# Patient Record
Sex: MALE | Race: WHITE | NOT HISPANIC OR LATINO
[De-identification: names, ages, dates, MRNs, and addresses within clinical notes are randomized per-mention and may not be internally consistent; named-entity substitution may affect disease eponyms.]

---

## 2018-07-30 ENCOUNTER — APPOINTMENT (OUTPATIENT)
Dept: COLORECTAL SURGERY | Facility: CLINIC | Age: 63
End: 2018-07-30
Payer: COMMERCIAL

## 2018-07-30 VITALS
HEART RATE: 72 BPM | SYSTOLIC BLOOD PRESSURE: 153 MMHG | BODY MASS INDEX: 37.94 KG/M2 | WEIGHT: 271 LBS | DIASTOLIC BLOOD PRESSURE: 89 MMHG | HEIGHT: 71 IN | TEMPERATURE: 98.8 F

## 2018-07-30 DIAGNOSIS — Z80.0 FAMILY HISTORY OF MALIGNANT NEOPLASM OF DIGESTIVE ORGANS: ICD-10-CM

## 2018-07-30 DIAGNOSIS — K62.1 RECTAL POLYP: ICD-10-CM

## 2018-07-30 DIAGNOSIS — E78.00 PURE HYPERCHOLESTEROLEMIA, UNSPECIFIED: ICD-10-CM

## 2018-07-30 DIAGNOSIS — Z78.9 OTHER SPECIFIED HEALTH STATUS: ICD-10-CM

## 2018-07-30 PROBLEM — Z00.00 ENCOUNTER FOR PREVENTIVE HEALTH EXAMINATION: Status: ACTIVE | Noted: 2018-07-30

## 2018-07-30 PROCEDURE — 46600 DIAGNOSTIC ANOSCOPY SPX: CPT

## 2018-07-30 PROCEDURE — 99203 OFFICE O/P NEW LOW 30 MIN: CPT | Mod: 25

## 2018-07-30 RX ORDER — FENOFIBRATE 145 MG/1
145 TABLET ORAL
Refills: 0 | Status: ACTIVE | COMMUNITY

## 2018-07-30 RX ORDER — SIMVASTATIN 40 MG/1
TABLET, FILM COATED ORAL
Refills: 0 | Status: ACTIVE | COMMUNITY

## 2018-09-26 NOTE — ASU PATIENT PROFILE, ADULT - PSH
History of hernia repair    History of tonsillectomy History of hernia repair  umbilical  History of tonsillectomy

## 2018-09-27 ENCOUNTER — APPOINTMENT (OUTPATIENT)
Dept: COLORECTAL SURGERY | Facility: HOSPITAL | Age: 63
End: 2018-09-27

## 2018-09-27 ENCOUNTER — RESULT REVIEW (OUTPATIENT)
Age: 63
End: 2018-09-27

## 2018-09-27 ENCOUNTER — OUTPATIENT (OUTPATIENT)
Dept: OUTPATIENT SERVICES | Facility: HOSPITAL | Age: 63
LOS: 1 days | Discharge: HOME | End: 2018-09-27
Payer: COMMERCIAL

## 2018-09-27 VITALS
OXYGEN SATURATION: 95 % | HEART RATE: 69 BPM | DIASTOLIC BLOOD PRESSURE: 81 MMHG | WEIGHT: 262.79 LBS | SYSTOLIC BLOOD PRESSURE: 158 MMHG | HEIGHT: 71.5 IN | TEMPERATURE: 98 F | RESPIRATION RATE: 18 BRPM

## 2018-09-27 VITALS
RESPIRATION RATE: 16 BRPM | HEART RATE: 72 BPM | OXYGEN SATURATION: 97 % | SYSTOLIC BLOOD PRESSURE: 117 MMHG | DIASTOLIC BLOOD PRESSURE: 69 MMHG

## 2018-09-27 DIAGNOSIS — Z98.890 OTHER SPECIFIED POSTPROCEDURAL STATES: Chronic | ICD-10-CM

## 2018-09-27 DIAGNOSIS — Z90.89 ACQUIRED ABSENCE OF OTHER ORGANS: Chronic | ICD-10-CM

## 2018-09-27 PROCEDURE — 45171 EXC RECT TUM TRANSANAL PART: CPT | Mod: GC

## 2018-09-27 PROCEDURE — 88305 TISSUE EXAM BY PATHOLOGIST: CPT

## 2018-09-27 PROCEDURE — 45171 EXC RECT TUM TRANSANAL PART: CPT

## 2018-09-27 RX ORDER — OXYCODONE HYDROCHLORIDE 5 MG/1
10 TABLET ORAL ONCE
Qty: 0 | Refills: 0 | Status: DISCONTINUED | OUTPATIENT
Start: 2018-09-27 | End: 2018-09-27

## 2018-09-27 RX ORDER — SODIUM CHLORIDE 9 MG/ML
1000 INJECTION, SOLUTION INTRAVENOUS
Qty: 0 | Refills: 0 | Status: DISCONTINUED | OUTPATIENT
Start: 2018-09-27 | End: 2018-09-27

## 2018-09-27 RX ORDER — OXYCODONE HYDROCHLORIDE 5 MG/1
5 TABLET ORAL ONCE
Qty: 0 | Refills: 0 | Status: DISCONTINUED | OUTPATIENT
Start: 2018-09-27 | End: 2018-09-27

## 2018-09-27 RX ORDER — ONDANSETRON 8 MG/1
4 TABLET, FILM COATED ORAL EVERY 6 HOURS
Qty: 0 | Refills: 0 | Status: DISCONTINUED | OUTPATIENT
Start: 2018-09-27 | End: 2018-09-27

## 2018-09-27 RX ORDER — DOCUSATE SODIUM 100 MG
1 CAPSULE ORAL
Qty: 30 | Refills: 0 | OUTPATIENT
Start: 2018-09-27

## 2018-09-27 NOTE — BRIEF OPERATIVE NOTE - OPERATION/FINDINGS
[id number] anal polyp, pedunculated in the right anterolateral position roughly1-2 cm distal to the dentate line. Polypectomy with ligassure. 2 figure-of-8 vicryl sutures placed for hemostasis. Hemostasis achieved at the end of the case.

## 2018-10-10 PROBLEM — K62.1 RECTAL POLYP: Chronic | Status: ACTIVE | Noted: 2018-09-26

## 2018-10-10 PROBLEM — G47.30 SLEEP APNEA, UNSPECIFIED: Chronic | Status: ACTIVE | Noted: 2018-09-27

## 2018-10-10 PROBLEM — E78.5 HYPERLIPIDEMIA, UNSPECIFIED: Chronic | Status: ACTIVE | Noted: 2018-09-26

## 2018-10-19 ENCOUNTER — APPOINTMENT (OUTPATIENT)
Dept: COLORECTAL SURGERY | Facility: CLINIC | Age: 63
End: 2018-10-19
Payer: COMMERCIAL

## 2018-10-19 VITALS
HEIGHT: 71 IN | HEART RATE: 89 BPM | BODY MASS INDEX: 36.82 KG/M2 | DIASTOLIC BLOOD PRESSURE: 89 MMHG | WEIGHT: 263 LBS | SYSTOLIC BLOOD PRESSURE: 144 MMHG | TEMPERATURE: 98.4 F

## 2018-10-19 PROCEDURE — 46600 DIAGNOSTIC ANOSCOPY SPX: CPT | Mod: 79

## 2018-10-19 PROCEDURE — 99024 POSTOP FOLLOW-UP VISIT: CPT

## 2020-01-02 NOTE — ASU PREOP CHECKLIST - SELECT TESTS ORDERED
Urinalysis/CBC/CMP/PT/PTT Post-Care Instructions: I reviewed with the patient in detail post-care instructions. Patient is not to engage in any heavy lifting, exercise, or swimming for the next 14 days. Should the patient develop any fevers, chills, bleeding, severe pain patient will contact the office immediately.

## 2023-06-01 ENCOUNTER — NON-APPOINTMENT (OUTPATIENT)
Age: 68
End: 2023-06-01

## 2023-06-02 ENCOUNTER — APPOINTMENT (OUTPATIENT)
Dept: COLORECTAL SURGERY | Facility: CLINIC | Age: 68
End: 2023-06-02
Payer: MEDICARE

## 2023-06-02 ENCOUNTER — NON-APPOINTMENT (OUTPATIENT)
Age: 68
End: 2023-06-02

## 2023-06-02 VITALS
DIASTOLIC BLOOD PRESSURE: 87 MMHG | SYSTOLIC BLOOD PRESSURE: 138 MMHG | WEIGHT: 270 LBS | BODY MASS INDEX: 37.8 KG/M2 | HEART RATE: 79 BPM | TEMPERATURE: 97.4 F | HEIGHT: 71 IN

## 2023-06-02 DIAGNOSIS — K64.8 OTHER HEMORRHOIDS: ICD-10-CM

## 2023-06-02 PROCEDURE — 99214 OFFICE O/P EST MOD 30 MIN: CPT | Mod: 25

## 2023-06-02 PROCEDURE — 46600 DIAGNOSTIC ANOSCOPY SPX: CPT

## 2023-06-02 NOTE — ASSESSMENT
[FreeTextEntry1] : I reviewed with the patient that I believe his symptoms are mostly attributable to incomplete evacuation secondary to stool consistency changes.  The symptoms are further exacerbated by his large internal/external hemorrhoids.  Recommend fiber and bulking agents to improve stool consistency.\par \par Advised follow-up in 4 to 6 weeks if symptoms are persistent.  All questions answered

## 2023-06-02 NOTE — HISTORY OF PRESENT ILLNESS
[FreeTextEntry1] : 69 y/o M presents for initial evaluation of anal polyp \par Patient known to Dr. Hoyt\par \par S/p transanal excision of rectal polyp, path c/w fibroepithelial polyp. \par \par Patient was last seen in the office 10/19/2018, No perianal excoriation. External hemorrhoid. Residual hemorrhoidal skin tags were noted. Sphincter tone was normal. There was no rectal mass. Anoscopy findings revealed, mild moderate internal hemorrhoids. \par \par Most recent colonoscopy performed [facility] with Dr. Sebastien Kang on 2/10/2023: Internal hemorrhoids were found. The hemorrhoids were moderate. There is a pedunculated anal skin tag in the anal canal. Multiple sessile polyps were found in the rectum and sigmoid colon. The polyps were diminutive in size.S/p removed. A small polyp was found in the sigmoid colon, s/p removed. Three sessile polyps were found in the descending, transverse and ascending colon. S/p removed. Scattered small and large diverticula found in the left colon and right colon.  The TI could not be intubated with the large cuff. 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 \par \par Pt doing well. Denies anal pain, bleeding or itching\par Admits hemorrhoids or tag seem to make cleaning after BMs difficult and has to wipe repeatedly. May sometimes feel wetness or irritation after hiking.\par \par Moving bowels daily, no complaints\par Denies ASA/NSAID use

## 2023-06-02 NOTE — PHYSICAL EXAM
[Normal rectal exam] : exam was normal [Excoriation] : no perianal excoriation [Skin Tags] : residual hemorrhoidal skin tags were noted [Normal] : was normal [None] : there was no rectal mass  [de-identified] : Moderate/large external hemorrhoids.  Left lateral largest. [FreeTextEntry1] : Medical assistant was present for the entire exam.\par \par Anoscopy was performed for evaluation of the patients rectal bleeding  history .\par The risks, benefits and alternatives were reviewed.\par \par A lighted anoscope was passed into the anal canal and the entire anal mucosal surface was inspected..  \par The findings revealed moderate/large internal hemorrhoids.\par \par Right posterior small hypertrophied papilla with stalk.\par

## 2024-02-02 ENCOUNTER — APPOINTMENT (OUTPATIENT)
Dept: COLORECTAL SURGERY | Facility: CLINIC | Age: 69
End: 2024-02-02
Payer: MEDICARE

## 2024-02-02 VITALS
DIASTOLIC BLOOD PRESSURE: 83 MMHG | TEMPERATURE: 97 F | HEIGHT: 71 IN | BODY MASS INDEX: 38.36 KG/M2 | HEART RATE: 85 BPM | SYSTOLIC BLOOD PRESSURE: 143 MMHG | WEIGHT: 274 LBS

## 2024-02-02 DIAGNOSIS — K62.0 ANAL POLYP: ICD-10-CM

## 2024-02-02 PROCEDURE — 99213 OFFICE O/P EST LOW 20 MIN: CPT | Mod: 25

## 2024-02-02 PROCEDURE — 46600 DIAGNOSTIC ANOSCOPY SPX: CPT

## 2024-02-02 NOTE — PHYSICAL EXAM
[Normal rectal exam] : exam was normal [Excoriation] : no perianal excoriation [Skin Tags] : residual hemorrhoidal skin tags were noted [Normal] : was normal [None] : there was no rectal mass  [de-identified] : Moderate/large external hemorrhoids.  Left lateral largest. [FreeTextEntry1] : Medical assistant was present for the entire exam.\par  \par  Anoscopy was performed for evaluation of the patients rectal bleeding  history .\par  The risks, benefits and alternatives were reviewed.\par  \par  A lighted anoscope was passed into the anal canal and the entire anal mucosal surface was inspected..  \par  The findings revealed moderate/large internal hemorrhoids.\par  \par  Right posterior small hypertrophied papilla with stalk.\par

## 2024-02-02 NOTE — ASSESSMENT
[FreeTextEntry1] : Advised conservative management for benign anal papilla.  Recommend fiber/bulking agents to improve stool consistency and alleviate incomplete defecation.

## 2024-02-02 NOTE — HISTORY OF PRESENT ILLNESS
[FreeTextEntry1] : 69 y/o M presents for f/u evaluation of anal polyp  h/o transanal excision of rectal polyp years ago, path c/w fibroepithelial polyp.  Seen in the office 10/19/2018, No perianal excoriation. External hemorrhoid. Residual hemorrhoidal skin tags were noted. Sphincter tone was normal. There was no rectal mass. Mild moderate internal hemorrhoids.  Most recent colonoscopy performed [facility] with Dr. Sebastien Kang on 2/10/2023: Internal hemorrhoids were found. The hemorrhoids were moderate. There is a pedunculated anal skin tag in the anal canal. Multiple sessile polyps were found in the rectum and sigmoid colon. The polyps were diminutive in size.S/p removed. A small polyp was found in the sigmoid colon, s/p removed. Three sessile polyps were found in the descending, transverse and ascending colon. S/p removed. Scattered small and large diverticula found in the left colon and right colon. The TI could not be intubated with the large cuff. Right sided retroflexion not performed.  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  Most recently seen in follow up 6/2/23; On exam, external hemorrhoid, residual hemorrhoidal skin tags noted. Moderate - large external hemorrhoids. Left lateral largest. Moderate/ large internal hemorrhoids. Right posterior small, hypertrophied papilla with stalk. Recommendations for fiber bulking agents to improve stool consistency. Patient advised to f/u 4-6 weeks if symptoms persist.   6 months ago noticed growth inside anal canal while cleaning himself after BMs as hemorrhoidal tissue is difficult to clean. Reports hemorrhoids get irritated or notices BRB spotting after hiking/excessive walking. Typically uses TP, water, Tuck's pads to clean the area  Reports he felt firm, non-tender growth that has stayed the same size or possible gotten smaller.  Denies pain or bleeding from rectum, but admits may have scant BRB on TP if he wipes excessively. Seen by PCP Mid December 2023 who performed [place] a few weeks ago and suggested it may be a cyst and to f/u with this office Moving bowels regularly, rare constipation Takes fiber gummies occasionally, drinks some water, drinks soda w/ seltzer  Denies ASA/NSAID use